# Patient Record
Sex: MALE | URBAN - METROPOLITAN AREA
[De-identification: names, ages, dates, MRNs, and addresses within clinical notes are randomized per-mention and may not be internally consistent; named-entity substitution may affect disease eponyms.]

---

## 2021-08-23 ENCOUNTER — LAB REQUISITION (OUTPATIENT)
Dept: LAB | Age: 61
End: 2021-08-23

## 2021-08-23 DIAGNOSIS — Z00.00 ENCOUNTER FOR GENERAL ADULT MEDICAL EXAMINATION WITHOUT ABNORMAL FINDINGS: ICD-10-CM

## 2021-08-23 PROCEDURE — 86480 TB TEST CELL IMMUN MEASURE: CPT | Performed by: CLINICAL MEDICAL LABORATORY

## 2021-08-23 PROCEDURE — PSEU9049 QUANTIFERON TB PLUS: Performed by: CLINICAL MEDICAL LABORATORY

## 2021-08-25 LAB
GAMMA INTERFERON BACKGROUND BLD IA-ACNC: 0.04 IU/ML
M TB IFN-G BLD-IMP: NEGATIVE
M TB IFN-G CD4+ BCKGRND COR BLD-ACNC: 0.02 IU/ML
M TB IFN-G CD4+CD8+ BCKGRND COR BLD-ACNC: 0.03 IU/ML
MITOGEN IGNF BCKGRD COR BLD-ACNC: 7.65 IU/ML

## 2022-09-19 ENCOUNTER — NURSE ONLY (OUTPATIENT)
Dept: INTERNAL MEDICINE CLINIC | Facility: HOSPITAL | Age: 62
End: 2022-09-19
Attending: EMERGENCY MEDICINE

## 2022-09-19 DIAGNOSIS — Z00.00 WELLNESS EXAMINATION: Primary | ICD-10-CM

## 2022-09-19 LAB
RUBV IGG SER QL: POSITIVE
RUBV IGG SER-ACNC: 124.1 IU/ML (ref 10–?)

## 2022-09-19 PROCEDURE — 86762 RUBELLA ANTIBODY: CPT

## 2022-09-19 PROCEDURE — 86480 TB TEST CELL IMMUN MEASURE: CPT

## 2022-09-19 PROCEDURE — 86735 MUMPS ANTIBODY: CPT

## 2022-09-19 PROCEDURE — 86765 RUBEOLA ANTIBODY: CPT

## 2022-09-19 PROCEDURE — 86787 VARICELLA-ZOSTER ANTIBODY: CPT

## 2022-09-20 LAB
M TB IFN-G CD4+ T-CELLS BLD-ACNC: 0.02 IU/ML
M TB TUBERC IFN-G BLD QL: NEGATIVE
M TB TUBERC IGNF/MITOGEN IGNF CONTROL: >10 IU/ML
QFT TB1 AG MINUS NIL: 0.01 IU/ML
QFT TB2 AG MINUS NIL: 0.01 IU/ML

## 2022-09-21 LAB
MEV IGG SER-ACNC: >300 AU/ML (ref 16.5–?)
MUV IGG SER IA-ACNC: 10.2 AU/ML (ref 11–?)
VZV IGG SER IA-ACNC: 2190 (ref 165–?)

## 2022-10-26 ENCOUNTER — NURSE ONLY (OUTPATIENT)
Dept: INTERNAL MEDICINE CLINIC | Facility: HOSPITAL | Age: 62
End: 2022-10-26
Attending: EMERGENCY MEDICINE

## 2022-10-26 ENCOUNTER — IMMUNIZATION (OUTPATIENT)
Dept: LAB | Facility: HOSPITAL | Age: 62
End: 2022-10-26
Attending: EMERGENCY MEDICINE

## 2022-10-26 DIAGNOSIS — Z23 NEED FOR VACCINATION: Primary | ICD-10-CM

## 2022-10-26 DIAGNOSIS — Z00.00 WELLNESS EXAMINATION: Primary | ICD-10-CM

## 2022-10-26 PROCEDURE — 86735 MUMPS ANTIBODY: CPT

## 2022-10-26 PROCEDURE — 90471 IMMUNIZATION ADMIN: CPT

## 2022-10-28 LAB — MUV IGG SER IA-ACNC: >300 AU/ML (ref 11–?)

## 2023-01-05 ENCOUNTER — OFFICE VISIT (OUTPATIENT)
Facility: CLINIC | Age: 63
End: 2023-01-05
Payer: COMMERCIAL

## 2023-01-05 VITALS
HEIGHT: 77 IN | BODY MASS INDEX: 27.16 KG/M2 | DIASTOLIC BLOOD PRESSURE: 84 MMHG | SYSTOLIC BLOOD PRESSURE: 134 MMHG | OXYGEN SATURATION: 98 % | RESPIRATION RATE: 14 BRPM | HEART RATE: 60 BPM | WEIGHT: 230 LBS

## 2023-01-05 DIAGNOSIS — Z00.00 ADULT GENERAL MEDICAL EXAM: Primary | ICD-10-CM

## 2023-01-05 DIAGNOSIS — Z12.11 SCREEN FOR COLON CANCER: ICD-10-CM

## 2023-01-05 PROCEDURE — 3008F BODY MASS INDEX DOCD: CPT | Performed by: INTERNAL MEDICINE

## 2023-01-05 PROCEDURE — 3075F SYST BP GE 130 - 139MM HG: CPT | Performed by: INTERNAL MEDICINE

## 2023-01-05 PROCEDURE — 99386 PREV VISIT NEW AGE 40-64: CPT | Performed by: INTERNAL MEDICINE

## 2023-01-05 PROCEDURE — 3079F DIAST BP 80-89 MM HG: CPT | Performed by: INTERNAL MEDICINE

## 2023-01-09 ENCOUNTER — LAB ENCOUNTER (OUTPATIENT)
Dept: LAB | Facility: HOSPITAL | Age: 63
End: 2023-01-09
Attending: INTERNAL MEDICINE

## 2023-01-09 DIAGNOSIS — Z00.00 ADULT GENERAL MEDICAL EXAM: ICD-10-CM

## 2023-01-09 LAB
ALBUMIN SERPL-MCNC: 4.1 G/DL (ref 3.4–5)
ALBUMIN/GLOB SERPL: 0.8 {RATIO} (ref 1–2)
ALP LIVER SERPL-CCNC: 58 U/L
ALT SERPL-CCNC: 33 U/L
ANION GAP SERPL CALC-SCNC: 4 MMOL/L (ref 0–18)
AST SERPL-CCNC: 22 U/L (ref 15–37)
BILIRUB SERPL-MCNC: 0.9 MG/DL (ref 0.1–2)
BUN BLD-MCNC: 8 MG/DL (ref 7–18)
BUN/CREAT SERPL: 6.7 (ref 10–20)
CALCIUM BLD-MCNC: 9.3 MG/DL (ref 8.5–10.1)
CHLORIDE SERPL-SCNC: 105 MMOL/L (ref 98–112)
CHOLEST SERPL-MCNC: 181 MG/DL (ref ?–200)
CO2 SERPL-SCNC: 29 MMOL/L (ref 21–32)
COMPLEXED PSA SERPL-MCNC: 1.83 NG/ML (ref ?–4)
CREAT BLD-MCNC: 1.19 MG/DL
DEPRECATED RDW RBC AUTO: 38.9 FL (ref 35.1–46.3)
ERYTHROCYTE [DISTWIDTH] IN BLOOD BY AUTOMATED COUNT: 12.1 % (ref 11–15)
EST. AVERAGE GLUCOSE BLD GHB EST-MCNC: 94 MG/DL (ref 68–126)
FASTING PATIENT LIPID ANSWER: YES
FASTING STATUS PATIENT QL REPORTED: YES
GFR SERPLBLD BASED ON 1.73 SQ M-ARVRAT: 69 ML/MIN/1.73M2 (ref 60–?)
GLOBULIN PLAS-MCNC: 4.9 G/DL (ref 2.8–4.4)
GLUCOSE BLD-MCNC: 94 MG/DL (ref 70–99)
HBA1C MFR BLD: 4.9 % (ref ?–5.7)
HCT VFR BLD AUTO: 47.5 %
HDLC SERPL-MCNC: 39 MG/DL (ref 40–59)
HGB BLD-MCNC: 15.7 G/DL
LDLC SERPL CALC-MCNC: 111 MG/DL (ref ?–100)
MCH RBC QN AUTO: 28.6 PG (ref 26–34)
MCHC RBC AUTO-ENTMCNC: 33.1 G/DL (ref 31–37)
MCV RBC AUTO: 86.5 FL
NONHDLC SERPL-MCNC: 142 MG/DL (ref ?–130)
OSMOLALITY SERPL CALC.SUM OF ELEC: 284 MOSM/KG (ref 275–295)
PLATELET # BLD AUTO: 176 10(3)UL (ref 150–450)
POTASSIUM SERPL-SCNC: 4.1 MMOL/L (ref 3.5–5.1)
PROT SERPL-MCNC: 9 G/DL (ref 6.4–8.2)
RBC # BLD AUTO: 5.49 X10(6)UL
SODIUM SERPL-SCNC: 138 MMOL/L (ref 136–145)
TRIGL SERPL-MCNC: 178 MG/DL (ref 30–149)
TSI SER-ACNC: 2.66 MIU/ML (ref 0.36–3.74)
VLDLC SERPL CALC-MCNC: 31 MG/DL (ref 0–30)
WBC # BLD AUTO: 4.3 X10(3) UL (ref 4–11)

## 2023-01-09 PROCEDURE — 85027 COMPLETE CBC AUTOMATED: CPT

## 2023-01-09 PROCEDURE — 36415 COLL VENOUS BLD VENIPUNCTURE: CPT

## 2023-01-09 PROCEDURE — 80053 COMPREHEN METABOLIC PANEL: CPT

## 2023-01-09 PROCEDURE — 84443 ASSAY THYROID STIM HORMONE: CPT

## 2023-01-09 PROCEDURE — 83036 HEMOGLOBIN GLYCOSYLATED A1C: CPT

## 2023-01-09 PROCEDURE — 80061 LIPID PANEL: CPT

## 2023-02-08 ENCOUNTER — OFFICE VISIT (OUTPATIENT)
Dept: INTEGRATIVE MEDICINE | Facility: CLINIC | Age: 63
End: 2023-02-08
Payer: COMMERCIAL

## 2023-02-08 VITALS
DIASTOLIC BLOOD PRESSURE: 70 MMHG | OXYGEN SATURATION: 98 % | SYSTOLIC BLOOD PRESSURE: 110 MMHG | HEART RATE: 109 BPM | WEIGHT: 230.63 LBS | BODY MASS INDEX: 27 KG/M2 | RESPIRATION RATE: 16 BRPM

## 2023-02-08 DIAGNOSIS — Z78.9 VEGETARIAN DIET: ICD-10-CM

## 2023-02-08 DIAGNOSIS — Z71.3 NUTRITIONAL COUNSELING: ICD-10-CM

## 2023-02-08 DIAGNOSIS — E78.5 DYSLIPIDEMIA: Primary | ICD-10-CM

## 2023-02-08 PROCEDURE — 99204 OFFICE O/P NEW MOD 45 MIN: CPT | Performed by: PHYSICIAN ASSISTANT

## 2023-02-08 PROCEDURE — 3078F DIAST BP <80 MM HG: CPT | Performed by: PHYSICIAN ASSISTANT

## 2023-02-08 PROCEDURE — 3074F SYST BP LT 130 MM HG: CPT | Performed by: PHYSICIAN ASSISTANT

## 2023-02-16 ENCOUNTER — NURSE ONLY (OUTPATIENT)
Facility: CLINIC | Age: 63
End: 2023-02-16

## 2023-02-16 DIAGNOSIS — Z12.11 SCREEN FOR COLON CANCER: Primary | ICD-10-CM

## 2023-02-16 NOTE — PROGRESS NOTES
No prior colonoscopy. No family hx of colon cancer. Anticoagulants: n/a  Diabetic Meds: n/a  BP meds(Ace inhibitors/ARB's): n/a  Weight loss meds (phentermine/vyvanse): n/a  Iron supplement (RX/OTC): n/a  Height & Weight/BMI: 6'5\"/230lb/27.27  Hx of Cardiac/CVA issues/(MI/Stroke): n/a   Devices Pacemaker/Defibrillator/Stents: n/a  Resp. Issues/Oxygen Use/GRAYSON/COPD: n/a  Issues w/Anesthesia: n/a    Symptoms (Y/N): N  Symptoms Details: n/a     Special comments/notes: verified allergies, medications, pharmacy      Please advise on orders and prep, thank you.

## 2023-02-16 NOTE — PROGRESS NOTES
Patient scheduled for first available 4/6 11:30 am. Patient confirmed phone number [phone]. Patient requesting appointment to be scheduled for sooner, also patient already completed questionnaire, asking if he needs to complete again. *Patient indicates she contacted our call center at 8:00 am and was told to wait for call. Patient indicates when he would  the phone, he would not hear anyone on the line.

## 2023-02-16 NOTE — PROGRESS NOTES
Attempted to reach patient x3, rings a few times then stops, unable to leave voice message. If patient calls back after 9am please reschedule and confirm best #.

## 2023-02-16 NOTE — PROGRESS NOTES
Ok to schedule colonoscopy with MAC with split golytely for colorectal cancer screening at [address] [place] [address] or [facility]    Thanks    Joceline Lee MD  [address] [place] [address] - Gastroenterology

## 2023-02-20 NOTE — PROGRESS NOTES
Scheduled for:  Colonoscopy-screen [address]    Provider Name:  Dr. Toni Zimmerman  Date:  Friday, 5/19/2023  Location:  [facility]  Sedation:  MAC  Time:  10:30 AM (pt is aware to arrive at 9:30 AM)  Prep:  Split dose golytely   Meds/Allergies Reconciled?: Physician reviewed  Diagnosis with codes:  Colorectal cancer screening Z12.11  Was patient informed to call insurance with codes (Y/N): I confirmed [employer] with this patient. Referral sent?:  Referral was sent at the time of electronic surgical scheduling. [facility] or [address] notified?:  I sent an electronic request to Endo Scheduling and received a confirmation today. Medication Orders:  n/a  Misc Orders:  Patient was informed that they will need a COVID 19 test prior to their procedure. Patient verbally understood & will await a phone call from [facility] to schedule. Further instructions given by staff:  I discussed the prep instructions with the patient which she verbally understood and is aware that I will send the instructions today via [address]

## 2023-02-23 ENCOUNTER — TELEPHONE (OUTPATIENT)
Facility: CLINIC | Age: 63
End: 2023-02-23

## 2023-02-23 DIAGNOSIS — Z12.11 SCREEN FOR COLON CANCER: Primary | ICD-10-CM

## 2023-02-23 NOTE — TELEPHONE ENCOUNTER
Rescheduled for:  Colonoscopy-screen [address]    Provider Name: From:Dr. David Jones To: Dr Solis Renee  Date:  From: 5/19/2023 To:05/22/2023  Location:  [facility]  Sedation:  MAC  Time:  From:10:30 AM [id number] (pt is aware to arrive at 7:30 AM)  Prep:  Split dose golytely   Meds/Allergies Reconciled?: Physician reviewed  Diagnosis with codes:  Colorectal cancer screening Z12.11  Was patient informed to call insurance with codes (Y/N): I confirmed [employer] with this patient. Referral sent?:  Referral was sent at the time of electronic surgical scheduling. [facility] or [address] notified?:  I sent an electronic change  request to Endo Scheduling and received a confirmation today. Medication Orders:  n/a    Misc Orders:  Patient requesting ONLY Monday for his procedure. Further instructions given by staff: I discussed the prep instructions with the patient which he verbally understood and is aware that I will send the instructions today via [employer].  Patient was informed about Covid testing prior procedure

## 2023-05-02 ENCOUNTER — TELEPHONE (OUTPATIENT)
Facility: CLINIC | Age: 63
End: 2023-05-02

## 2023-05-02 DIAGNOSIS — Z12.11 COLON CANCER SCREENING: Primary | ICD-10-CM

## 2023-05-03 NOTE — TELEPHONE ENCOUNTER
Spoke with the patient and went over the prep questions and also routed message to Dr Rigo Agrawal to send prep to the [place] on file

## 2023-05-03 NOTE — TELEPHONE ENCOUNTER
Dr Reinaldo Daigle     Please send prep to the [facility] on file in [facility]     Procedure is on 5/22/2023

## 2023-05-15 ENCOUNTER — TELEPHONE (OUTPATIENT)
Facility: CLINIC | Age: 63
End: 2023-05-15

## 2023-05-15 NOTE — TELEPHONE ENCOUNTER
Pt is requesting for prep medicine he states pharmacy did not get it yet please follow up pt is requesting for this today if possible thanks

## 2023-05-15 NOTE — TELEPHONE ENCOUNTER
I called [facility] and spoke to [address] [employer] [address] prep is ready for the pt    I called the pt and he is aware the bowel prep is ready for

## 2023-05-16 RX ORDER — SODIUM CHLORIDE 0.9 % (FLUSH) 0.9 %
10 SYRINGE (ML) INJECTION AS NEEDED
OUTPATIENT
Start: 2023-05-16

## 2023-05-16 RX ORDER — SODIUM CHLORIDE, SODIUM LACTATE, POTASSIUM CHLORIDE, CALCIUM CHLORIDE 600; 310; 30; 20 MG/100ML; MG/100ML; MG/100ML; MG/100ML
INJECTION, SOLUTION INTRAVENOUS CONTINUOUS
OUTPATIENT
Start: 2023-05-16

## 2023-05-22 ENCOUNTER — HOSPITAL ENCOUNTER (OUTPATIENT)
Facility: HOSPITAL | Age: 63
Setting detail: HOSPITAL OUTPATIENT SURGERY
Discharge: HOME | End: 2023-05-22
Attending: STUDENT IN AN ORGANIZED HEALTH CARE EDUCATION/TRAINING PROGRAM | Admitting: STUDENT IN AN ORGANIZED HEALTH CARE EDUCATION/TRAINING PROGRAM
Payer: COMMERCIAL

## 2023-05-22 VITALS
RESPIRATION RATE: 15 BRPM | SYSTOLIC BLOOD PRESSURE: 135 MMHG | HEART RATE: 65 BPM | WEIGHT: 225 LBS | BODY MASS INDEX: 26.57 KG/M2 | OXYGEN SATURATION: 100 % | DIASTOLIC BLOOD PRESSURE: 85 MMHG | HEIGHT: 77 IN

## 2023-05-22 DIAGNOSIS — Z12.11 SCREEN FOR COLON CANCER: ICD-10-CM

## 2023-05-22 PROCEDURE — 0DBN8ZX EXCISION OF SIGMOID COLON, VIA NATURAL OR ARTIFICIAL OPENING ENDOSCOPIC, DIAGNOSTIC: ICD-10-PCS | Performed by: STUDENT IN AN ORGANIZED HEALTH CARE EDUCATION/TRAINING PROGRAM

## 2023-05-22 PROCEDURE — 0DBL8ZX EXCISION OF TRANSVERSE COLON, VIA NATURAL OR ARTIFICIAL OPENING ENDOSCOPIC, DIAGNOSTIC: ICD-10-PCS | Performed by: STUDENT IN AN ORGANIZED HEALTH CARE EDUCATION/TRAINING PROGRAM

## 2023-05-22 PROCEDURE — G0500 MOD SEDAT ENDO SERVICE >5YRS: HCPCS | Performed by: STUDENT IN AN ORGANIZED HEALTH CARE EDUCATION/TRAINING PROGRAM

## 2023-05-22 PROCEDURE — 45380 COLONOSCOPY AND BIOPSY: CPT | Performed by: STUDENT IN AN ORGANIZED HEALTH CARE EDUCATION/TRAINING PROGRAM

## 2023-05-22 RX ORDER — MIDAZOLAM HYDROCHLORIDE 1 MG/ML
1 INJECTION INTRAMUSCULAR; INTRAVENOUS EVERY 5 MIN PRN
Status: DISCONTINUED | OUTPATIENT
Start: 2023-05-22 | End: 2023-05-22

## 2023-05-22 RX ORDER — MIDAZOLAM HYDROCHLORIDE 1 MG/ML
INJECTION INTRAMUSCULAR; INTRAVENOUS
Status: DISCONTINUED | OUTPATIENT
Start: 2023-05-22 | End: 2023-05-22

## 2023-05-22 NOTE — H&P
History & Physical Examination    Patient Name: Tristen Snyder  MRN: [id number]  [facility]: [id number]  YOB: 1960    Diagnosis: screening for colon cancer    polyethylene glycol, PEG 3350-KCl-NaBcb-NaCl-NaSulf, 236 g Oral Recon Soln, Take 4,000 mL by mouth As Directed. Take 2,000 mL the night before your procedure and 2,000 mL the morning of your procedure., Disp: 1 each, Rfl: 0      No current facility-administered medications for this encounter. Allergies: No Known Allergies    History reviewed. No pertinent past medical history. History reviewed. No pertinent surgical history. History reviewed. No pertinent family history. Social History    Tobacco Use      Smoking status: Never      Smokeless tobacco: Never    Vaping Use      Vaping Use: Never used    Alcohol use: Never      SYSTEM Check if Review is Normal Check if Physical Exam is Normal If not normal, please explain:   VERENA [employer] ] [ Opal Cross  [employer] ] [ Joel Sawyer [employer] ] [ Airam Sahni [employer] ] [ Tracy Issa [employer] ] [ Tuan Zhao [employer] ] [ Norma Finely        I have discussed the risks and benefits and alternatives of the procedure with the patient/family. They understand and agree to proceed with plan of care. I have reviewed the History and Physical done within the last 30 days. Any changes noted above.     Vin Simental MD  [facility], [facility] Gastroenterology

## 2023-05-22 NOTE — DISCHARGE INSTRUCTIONS

## 2023-05-23 ENCOUNTER — TELEPHONE (OUTPATIENT)
Facility: CLINIC | Age: 63
End: 2023-05-23

## 2023-05-23 NOTE — PROGRESS NOTES
1 tubular adenoma, 3 hyperplastic polyps, needs repeat in 5 years. GI Staff:    Can you please place recall for this patient to have a colonoscopy in 5 years. Thank you.     Deedee Borrego MD

## 2023-05-23 NOTE — TELEPHONE ENCOUNTER
I called the patient to discuss biopsy results from recent colonoscopy. I did send him a detailed [employer] message with the results. He did not answer my phone call but I left a message stating I was completing my chart message. If he has further questions he should call the clinic and I can further discuss with him.

## 2023-05-24 ENCOUNTER — TELEPHONE (OUTPATIENT)
Facility: CLINIC | Age: 63
End: 2023-05-24

## 2023-05-24 NOTE — TELEPHONE ENCOUNTER
Health maintenance updated.      Last colonoscopy done 5/22/2023    Recall placed into Pt Outreach, next due on 5/2028 per Dr. Maria Del Carmen Carpenter

## 2023-05-26 ENCOUNTER — MED REC SCAN ONLY (OUTPATIENT)
Facility: CLINIC | Age: 63
End: 2023-05-26

## 2023-11-20 NOTE — TELEPHONE ENCOUNTER
Routed to RN triage to run for protocol , thanks. Requested Prescriptions     Pending Prescriptions Disp Refills    indomethacin 50 MG Oral Cap 90 capsule 3     Sig: Once a day    allopurinol 100 MG Oral Tab 90 tablet 3     Sig: Take 1 tablet (100 mg total) by mouth daily.        Last Office Visit with PCP: 1/5/2023  Last Blood Pressures:  BP Readings from Last 2 Encounters:   05/22/23 135/85   02/08/23 110/70

## 2023-11-21 RX ORDER — INDOMETHACIN 50 MG/1
CAPSULE ORAL
Qty: 90 CAPSULE | Refills: 0 | Status: SHIPPED | OUTPATIENT
Start: 2023-11-21

## 2023-11-21 RX ORDER — ALLOPURINOL 100 MG/1
100 TABLET ORAL DAILY
Qty: 90 TABLET | Refills: 0 | Status: SHIPPED | OUTPATIENT
Start: 2023-11-21

## 2023-11-22 NOTE — TELEPHONE ENCOUNTER
BOTH prescriptions were not listed under ACTIVE or INACTIVE medication record. BUT on unreconciled outside medication . Pended and sent to PCP for approval.     [employer]pper message sent . CSS=please assists for 6 month follow up. LAST VISIT 1/5/2023. No future appointments. Please review; protocol failed/no protocol. Requested Prescriptions   Pending Prescriptions Disp Refills    indomethacin 50 MG Oral Cap 90 capsule 3     Sig: Once a day       Non-Narcotic Pain Medication Protocol Failed - 11/20/2023  1:53 PM        Failed - In person appointment or virtual visit in the past 6 mos or appointment in next 3 mos     Recent Outpatient Visits              9 months ago Screen for colon cancer    [address], [address], [facility] 14 Only    9 months ago Dyslipidemia    [address], [address], Jún[address]endleton., PA-C    Office Visit    10 months ago Adult general medical exam    oRnan Alvarado MD    Office Visit    1 year ago Wellness examination    [address]    Nurse Only    1 year ago Wellness examination    [address]    Nurse Only                        allopurinol 100 MG Oral Tab 90 tablet 3     Sig: Take 1 tablet (100 mg total) by mouth daily.        There is no refill protocol information for this order           Recent Outpatient Visits              9 months ago Screen for colon cancer    Elizabet Ramirez    Nurse Only    9 months ago Dyslipidemia    [address], [address] PA-C    Office Visit    10 months ago Adult general medical exam    [address], Ronan Landaverde MD    Office Visit    1 year ago Wellness examination    [address]    Nurse Only    1 year ago Wellness examination [address]    Nurse Only

## 2023-12-04 ENCOUNTER — LAB ENCOUNTER (OUTPATIENT)
Dept: LAB | Facility: HOSPITAL | Age: 63
End: 2023-12-04
Attending: PREVENTIVE MEDICINE
Payer: COMMERCIAL

## 2023-12-04 ENCOUNTER — TELEPHONE (OUTPATIENT)
Dept: INTERNAL MEDICINE CLINIC | Facility: HOSPITAL | Age: 63
End: 2023-12-04

## 2023-12-04 DIAGNOSIS — Z20.822 SUSPECTED COVID-19 VIRUS INFECTION: ICD-10-CM

## 2023-12-04 DIAGNOSIS — Z20.822 SUSPECTED COVID-19 VIRUS INFECTION: Primary | ICD-10-CM

## 2023-12-04 LAB — SARS-COV-2 RNA RESP QL NAA+PROBE: NOT DETECTED

## 2023-12-04 NOTE — TELEPHONE ENCOUNTER
Results and RTW guidelines:    COVID RESULT:    [x] Viewed by employee in [address] RTW plan and instructions as indicated on triage call. Manager notified. Estimated RTW date: 12/04/2023  [x] Discussed with employee   [] Unable to reach by phone.   Sent via [employer] [facility] message      Test type:    [x] Rapid         [] Alinity         [] Outside test:       [x] NEGATIVE     Ordered Alinity retest?  []Yes   [x] No (skip to RTW)   Ordered Rapid retest?   []Yes   [x] No (skip to RTW)           Dated to be taken:      If Yes, PLACE ORDER NOW and instruct the following:  -Originally Symptomatic or Now Symptoms:   -RTW when sx improve- fever free for 24 hours w/o medications, Diarrhea/Vomiting for 24 hours w/o medications    -Originally  Asymptomatic  -Asymptomatic AND Vaccinated or Unvaccinated or Prior infection in past 90 days:   -May work and continue to monitor symptoms for the next 10 days.                                         -Alinity test day 2, Alinity test day 5 (day 0 - exposure)

## 2023-12-04 NOTE — TELEPHONE ENCOUNTER
Outside Covid Testing done    Results and RTW guidelines:    COVID RESULT reported:      Test type:    [] Rapid outside         [] PCR outside       [x] Home Test    Date of test: 12/3/2023     Test location: Home         [] Result viewed in Epic with verbal consent received from employee     [x] Results per Employee Covid Dashboard    [] Employee instructed to email copy of result to [email or web address]       [x] Discussed with employee     [] Unable to reach by phone. Sent via [employer]      [x] NEGATIVE     Ordered Alinity retest?  []Yes   [x] No (skip to RTW)   Ordered Rapid retest?   [x]Yes   [] No (skip to RTW)        Dated to be taken:  12/04/2023  If Yes, PLACE ORDER NOW and instruct the following:  -Originally Symptomatic or Now Symptoms:   -RTW when sx improve- fever free for 24 hours w/o medications, Diarrhea/Vomiting for 24 hours w/o medications     -Originally  Asymptomatic  -Asymptomatic AND Vaccinated or Unvaccinated or Prior infection in past 90 days:     -May work and continue to monitor symptoms for the next 14 days.                                           -Rapid test day 2, rapid test day 5 (day 0 - exposure)              Notes:     RTW PLAN:    []  If COVID positive results, off work minimum of 5 days from positive test or onset of symptoms (day 0)        On day 5, if asymptomatic or mildly symptomatic (with improving symptoms) may return to work day 6          On day 5, if symptomatic, call Employee Health for RTW screening        []  COVID positive result - call Employee Health on day 5 after symptom onset. The employee needs to be cleared by Employee Health to RTW. [] RTW immediately, continue to monitor for sx  [] RTW when sx improve; must be fever free for 24 hours w/o medications, Diarrhea/Vomiting free for 24 hours w/o medications  [] Alinity ordered; continue to monitor sx and call for new/worsening sx.   Discuss RTW guidelines with manager [x] Rapid ordered to confirm home negative. [] May continue to work  [] Follow up with PCP  [] Home until further instruction from hotline with Alinity results  INSTRUCTIONS PROVIDED:  [x]  Plan as noted above  [x]  Length of time to obtain results  [x]  May return to work if views negative in My Chart and  remains fever, vomiting, and diarrhea free  []  May continue to work if remains asymptomatic   []  Quarantine instructions  [x]  Masking protocol  [x]  S/S of worsening infection/condition and importance of prompt medical re-evaluation including when to seek emergency care  [] If symptoms develop, stay home and call hotline for rapid test order    Estimated RTW date:  Pending Test results  [x] Manager notified        [] [address] [facility]  []CHRISTIE   [x] [facility]  Manager : Jeronimo Curtis COVID-19 Vaccine?  Yes [x]    No []          If yes, date(s) received: 04/03/2021; 04/24/2021; 12/16/2022           Which vaccine:  Pfizer [x]     Billy Arzate []    J&J []      SYMPTOMS (reported via dashboard):  [] asymptomatic  [x] symptomatic  [] GI symptoms only    Symptom onset date: 12/3/2023    Fever   > 100F             Yes []      Cough                          Yes []      Shortness of breath  Yes []      Congestion                 Yes []      Runny nose                Yes []        Loss of Smell              Yes []        Loss of Taste             Yes []       Sore throat                 Yes [x]       Fatigue                        Yes []       Body Aches                Yes []        Chills                           Yes []        Headache                   Yes []             GI symptoms             Yes []     No [x]                     Nausea   []          Vomiting            []                                    Diarrhea  []          Upset stomach []      Employee has positive COVID Exposure? Yes []     No [x]    Date of exposure:     []  Coworker                       [] patient                        [] Family/friend    Employee has a history of Covid?   Yes []     No [x]   If Yes, when:    When was the last shift you worked?: 12/01/2023    Notes:

## 2024-02-08 ENCOUNTER — TELEPHONE (OUTPATIENT)
Facility: CLINIC | Age: 64
End: 2024-02-08

## 2024-02-08 NOTE — TELEPHONE ENCOUNTER
Pt came in to drop off a medical exemption request form that needs to be filled out by pcp  Form has been placed in drs bin  Pt needs it asap

## 2024-02-12 NOTE — TELEPHONE ENCOUNTER
Patient sent a [employer] message; please advise patient when form is complete and faxed.    I ssubmitted a form to be signed by Dr. Beyer. I dropped it off at the office on Wednesday 2/7. I asked for it to be faxed back to the number written on the form. When will I received it?

## 2024-02-16 NOTE — TELEPHONE ENCOUNTER
Verified name and date of birth.   The patient is requesting a physical with the form to be filled out.   Instructed their maybe an additional fee.  I cannot guarantee their will not be a fee.    He stated he needs a physical with the form filled out.   Transferred to the call center for the patient schedule via [employer] and did not schedule a physical.    Last office visit 1/5/23.

## 2024-02-20 ENCOUNTER — OFFICE VISIT (OUTPATIENT)
Dept: INTERNAL MEDICINE CLINIC | Facility: CLINIC | Age: 64
End: 2024-02-20
Payer: COMMERCIAL

## 2024-02-20 ENCOUNTER — LAB ENCOUNTER (OUTPATIENT)
Dept: LAB | Age: 64
End: 2024-02-20
Attending: NURSE PRACTITIONER
Payer: COMMERCIAL

## 2024-02-20 VITALS
WEIGHT: 223 LBS | HEART RATE: 52 BPM | SYSTOLIC BLOOD PRESSURE: 160 MMHG | HEIGHT: 77 IN | BODY MASS INDEX: 26.33 KG/M2 | DIASTOLIC BLOOD PRESSURE: 83 MMHG

## 2024-02-20 DIAGNOSIS — E55.9 VITAMIN D DEFICIENCY: ICD-10-CM

## 2024-02-20 DIAGNOSIS — Z00.00 ANNUAL PHYSICAL EXAM: ICD-10-CM

## 2024-02-20 DIAGNOSIS — E53.8 VITAMIN B 12 DEFICIENCY: ICD-10-CM

## 2024-02-20 DIAGNOSIS — Z00.00 ANNUAL PHYSICAL EXAM: Primary | ICD-10-CM

## 2024-02-20 DIAGNOSIS — Z00.00 PHYSICAL EXAM: ICD-10-CM

## 2024-02-20 DIAGNOSIS — M54.32 SCIATICA OF LEFT SIDE: ICD-10-CM

## 2024-02-20 DIAGNOSIS — I10 PRIMARY HYPERTENSION: ICD-10-CM

## 2024-02-20 LAB
ALBUMIN SERPL-MCNC: 4.3 G/DL (ref 3.2–4.8)
ALBUMIN/GLOB SERPL: 1.1 {RATIO} (ref 1–2)
ALP LIVER SERPL-CCNC: 51 U/L
ALT SERPL-CCNC: 21 U/L
ANION GAP SERPL CALC-SCNC: 7 MMOL/L (ref 0–18)
AST SERPL-CCNC: 24 U/L (ref ?–34)
BILIRUB SERPL-MCNC: 1.1 MG/DL (ref 0.2–1.1)
BUN BLD-MCNC: 10 MG/DL (ref 9–23)
BUN/CREAT SERPL: 9.3 (ref 10–20)
CALCIUM BLD-MCNC: 9.9 MG/DL (ref 8.7–10.4)
CHLORIDE SERPL-SCNC: 105 MMOL/L (ref 98–112)
CHOLEST SERPL-MCNC: 191 MG/DL (ref ?–200)
CO2 SERPL-SCNC: 28 MMOL/L (ref 21–32)
COMPLEXED PSA SERPL-MCNC: 1.62 NG/ML (ref ?–4)
CREAT BLD-MCNC: 1.08 MG/DL
DEPRECATED RDW RBC AUTO: 39.3 FL (ref 35.1–46.3)
EGFRCR SERPLBLD CKD-EPI 2021: 77 ML/MIN/1.73M2 (ref 60–?)
ERYTHROCYTE [DISTWIDTH] IN BLOOD BY AUTOMATED COUNT: 12.6 % (ref 11–15)
FASTING PATIENT LIPID ANSWER: YES
FASTING STATUS PATIENT QL REPORTED: YES
GLOBULIN PLAS-MCNC: 4 G/DL (ref 2.8–4.4)
GLUCOSE BLD-MCNC: 93 MG/DL (ref 70–99)
HCT VFR BLD AUTO: 44.2 %
HDLC SERPL-MCNC: 37 MG/DL (ref 40–59)
HGB BLD-MCNC: 14.7 G/DL
LDLC SERPL CALC-MCNC: 130 MG/DL (ref ?–100)
MCH RBC QN AUTO: 28.4 PG (ref 26–34)
MCHC RBC AUTO-ENTMCNC: 33.3 G/DL (ref 31–37)
MCV RBC AUTO: 85.3 FL
NONHDLC SERPL-MCNC: 154 MG/DL (ref ?–130)
OSMOLALITY SERPL CALC.SUM OF ELEC: 289 MOSM/KG (ref 275–295)
PLATELET # BLD AUTO: 167 10(3)UL (ref 150–450)
POTASSIUM SERPL-SCNC: 4.3 MMOL/L (ref 3.5–5.1)
PROT SERPL-MCNC: 8.3 G/DL (ref 5.7–8.2)
RBC # BLD AUTO: 5.18 X10(6)UL
SODIUM SERPL-SCNC: 140 MMOL/L (ref 136–145)
TRIGL SERPL-MCNC: 131 MG/DL (ref 30–149)
TSI SER-ACNC: 2.4 MIU/ML (ref 0.55–4.78)
VIT B12 SERPL-MCNC: 1894 PG/ML (ref 211–911)
VIT D+METAB SERPL-MCNC: 18.1 NG/ML (ref 30–100)
VLDLC SERPL CALC-MCNC: 24 MG/DL (ref 0–30)
WBC # BLD AUTO: 4 X10(3) UL (ref 4–11)

## 2024-02-20 PROCEDURE — 80053 COMPREHEN METABOLIC PANEL: CPT

## 2024-02-20 PROCEDURE — 99396 PREV VISIT EST AGE 40-64: CPT | Performed by: NURSE PRACTITIONER

## 2024-02-20 PROCEDURE — 90715 TDAP VACCINE 7 YRS/> IM: CPT | Performed by: NURSE PRACTITIONER

## 2024-02-20 PROCEDURE — 36415 COLL VENOUS BLD VENIPUNCTURE: CPT

## 2024-02-20 PROCEDURE — 85027 COMPLETE CBC AUTOMATED: CPT

## 2024-02-20 PROCEDURE — 84443 ASSAY THYROID STIM HORMONE: CPT

## 2024-02-20 PROCEDURE — 90471 IMMUNIZATION ADMIN: CPT | Performed by: NURSE PRACTITIONER

## 2024-02-20 PROCEDURE — 82306 VITAMIN D 25 HYDROXY: CPT

## 2024-02-20 PROCEDURE — 82607 VITAMIN B-12: CPT

## 2024-02-20 PROCEDURE — 80061 LIPID PANEL: CPT

## 2024-02-20 RX ORDER — CHOLECALCIFEROL (VITAMIN D3) 1250 MCG
1 CAPSULE ORAL WEEKLY
Qty: 12 CAPSULE | Refills: 0 | Status: SHIPPED | OUTPATIENT
Start: 2024-02-20 | End: 2024-05-08

## 2024-02-20 NOTE — ASSESSMENT & PLAN NOTE
Normal exam.  Labs as ordered.  Skin check normal.  Hernial orifices intact.  No cervical, inguinal, axillary lymphadenopathy.    Colonoscopy- in 2023.  Immunizations TDAP today             I

## 2024-02-20 NOTE — ASSESSMENT & PLAN NOTE
Hypertension    Blood pressure 160/83, pulse 52, height 6' 5\" (1.956 m), weight 223 lb (101.2 kg).   Patient does not want any medication    Plan  Education material sent to his mychart  Try magnesium supplement  Return in two weeks

## 2024-02-20 NOTE — PROGRESS NOTES
HPI:    Patient ID: Aguila Pradhan is a 64 year old male.  Lives with wife.   He has 3 children  HPI Physical Exam  64 year old male who is a patient of Dr. Beyer.  I am meeting him for the first time.  He works as a  at [employer] and [employer]     Back Pain  Has some sciatica pain down his left leg.    HTN  Blood pressure 160/83, pulse 52, height 6' 5\" (1.956 m), weight 223 lb (101.2 kg).     Colonoscopy 2023- Returned in 5 years due 2028  Immunization History   Administered Date(s) Administered    Covid-19 Vaccine Pfizer 30 mcg/0.3 ml 04/03/2021, 04/24/2021, 12/16/2021    Flucelvax 0.5 Ml Quad PFS Single Dose 10/26/2022    TDAP 02/20/2024       Past Medical History:   Diagnosis Date    Primary hypertension 2/20/2024      Past Surgical History:   Procedure Laterality Date    Colonoscopy N/A 5/22/2023    Procedure: COLONOSCOPY;  Surgeon: Galileo Madera MD;  Location: [facility] ENDOSCOPY      Social History     Socioeconomic History    Marital status:    Tobacco Use    Smoking status: Never    Smokeless tobacco: Never   Vaping Use    Vaping Use: Never used   Substance and Sexual Activity    Alcohol use: Never    Drug use: Never    Sexual activity: Yes          Review of Systems   Constitutional:  Negative for chills, fatigue and fever.   HENT:  Negative for congestion, ear pain, hearing loss, sinus pain, sore throat and trouble swallowing.    Eyes:  Negative for pain and visual disturbance.   Respiratory:  Negative for cough, chest tightness and shortness of breath.    Cardiovascular:  Negative for chest pain, palpitations and leg swelling.   Gastrointestinal:  Negative for abdominal pain, constipation, diarrhea, nausea and vomiting.   Endocrine: Negative for cold intolerance and heat intolerance.   Genitourinary:  Negative for dysuria and hematuria.   Musculoskeletal:  Positive for back pain. Negative for joint swelling.   Skin:  Negative for rash.   Allergic/Immunologic: Negative for environmental  allergies.   Neurological:  Negative for weakness, numbness and headaches.   Hematological:  Does not bruise/bleed easily.   Psychiatric/Behavioral:  Negative for dysphoric mood and sleep disturbance. The patient is not nervous/anxious.               Current Outpatient Medications   Medication Sig Dispense Refill    allopurinol 100 MG Oral Tab Take 1 tablet (100 mg total) by mouth daily. 90 tablet 0    indomethacin 50 MG Oral Cap Once a day (Patient not taking: Reported on 2/20/2024) 90 capsule 0     Allergies:No Known Allergies   PHYSICAL EXAM:   Physical Exam  Constitutional:       Appearance: Normal appearance. He is well-developed.   HENT:      Head: Normocephalic.      Right Ear: Tympanic membrane normal.      Left Ear: Tympanic membrane normal.      Nose: Nose normal.      Mouth/Throat:      Mouth: Mucous membranes are moist.      Pharynx: No oropharyngeal exudate or posterior oropharyngeal erythema.   Eyes:      General:         Right eye: No discharge.         Left eye: No discharge.      Pupils: Pupils are equal, round, and reactive to light.   Cardiovascular:      Rate and Rhythm: Normal rate and regular rhythm.      Heart sounds: Normal heart sounds. No murmur heard.     No friction rub. No gallop.   Pulmonary:      Effort: Pulmonary effort is normal. No respiratory distress.      Breath sounds: Normal breath sounds. No wheezing, rhonchi or rales.   Abdominal:      General: Bowel sounds are normal. There is no distension.      Palpations: Abdomen is soft. There is no mass.      Tenderness: There is no abdominal tenderness. There is no right CVA tenderness, left CVA tenderness or guarding.   Musculoskeletal:         General: No tenderness.      Cervical back: Normal range of motion and neck supple. No tenderness.      Right lower leg: No edema.      Left lower leg: No edema.   Lymphadenopathy:      Cervical: No cervical adenopathy.   Skin:     General: Skin is warm and dry.      Findings: No rash.    Neurological:      Mental Status: He is alert and oriented to person, place, and time.      Coordination: Coordination normal.      Gait: Gait normal.   Psychiatric:         Mood and Affect: Mood normal.         Behavior: Behavior normal.         Thought Content: Thought content normal.         Judgment: Judgment normal.       /83 (BP Location: Left arm, Patient Position: Sitting, Cuff Size: adult)   Pulse 52   Ht 6' 5\" (1.956 m)   Wt 223 lb (101.2 kg)   BMI 26.44 kg/m²   Wt Readings from Last 2 Encounters:   02/20/24 223 lb (101.2 kg)   05/22/23 225 lb (102.1 kg)     Body mass index is 26.44 kg/m².(2)  Lab Results   Component Value Date    WBC 4.3 01/09/2023    RBC 5.49 01/09/2023    HGB 15.7 01/09/2023    HCT 47.5 01/09/2023    MCV 86.5 01/09/2023    MCH 28.6 01/09/2023    MCHC 33.1 01/09/2023    RDW 12.1 01/09/2023    .0 01/09/2023      Lab Results   Component Value Date    GLU 94 01/09/2023    BUN 8 01/09/2023    BUNCREA 6.7 (L) 01/09/2023    CREATSERUM 1.19 01/09/2023    ANIONGAP 4 01/09/2023    CA 9.3 01/09/2023    OSMOCALC 284 01/09/2023    ALKPHO 58 01/09/2023    AST 22 01/09/2023    ALT 33 01/09/2023    BILT 0.9 01/09/2023    TP 9.0 (H) 01/09/2023    ALB 4.1 01/09/2023    GLOBULIN 4.9 (H) 01/09/2023     01/09/2023    K 4.1 01/09/2023     01/09/2023    CO2 29.0 01/09/2023      Lab Results   Component Value Date    EAG 94 01/09/2023    A1C 4.9 01/09/2023      Lab Results   Component Value Date    CHOLEST 181 01/09/2023    TRIG 178 (H) 01/09/2023    HDL 39 (L) 01/09/2023     (H) 01/09/2023    VLDL 31 (H) 01/09/2023    NONHDLC 142 (H) 01/09/2023      Lab Results   Component Value Date    TSH 2.660 01/09/2023                ASSESSMENT/PLAN:     Problem List Items Addressed This Visit       Physical exam     Normal exam.  Labs as ordered.  Skin check normal.  Hernial orifices intact.  No cervical, inguinal, axillary lymphadenopathy.    Colonoscopy- in 2023.  Immunizations  TDAP today             I          Primary hypertension     Hypertension    Blood pressure 160/83, pulse 52, height 6' 5\" (1.956 m), weight 223 lb (101.2 kg).   Patient does not want any medication    Plan  Education material sent to his [employer]  Try magnesium supplement  Return in two weeks           Sciatica of left side     Intermittent sciatic pain    Plan  Educational material sent to patient via [email or web address]          Other Visit Diagnoses       Annual physical exam    -  Primary    Relevant Orders    Comp Metabolic Panel (14)    Lipid Panel    TSH W Reflex To Free T4    Vitamin D, 25-Hydroxy    Vitamin B12    PSA Total, Screen    CBC, Platelet; No Differential    Vitamin D deficiency        Relevant Orders    PSA Total, Screen    Vitamin B 12 deficiency        Relevant Orders    Vitamin B12               Orders Placed This Encounter   Procedures    Comp Metabolic Panel (14)    Lipid Panel    TSH W Reflex To Free T4    Vitamin D, 25-Hydroxy    Vitamin B12    PSA Total, Screen    CBC, Platelet; No Differential    TETANUS, DIPHTHERIA TOXOIDS AND ACELLULAR PERTUSIS VACCINE (TDAP), >7 YEARS, IM USE     Follow up in two weeks    Meds This Visit:  Requested Prescriptions      No prescriptions requested or ordered in this encounter       Imaging & Referrals:  TETANUS, DIPHTHERIA TOXOIDS AND ACELLULAR PERTUSIS VACCINE (TDAP), >7 YEARS, IM USE         RD Mcclellan

## 2024-02-21 ENCOUNTER — MED REC SCAN ONLY (OUTPATIENT)
Dept: INTERNAL MEDICINE CLINIC | Facility: CLINIC | Age: 64
End: 2024-02-21

## 2025-02-21 ENCOUNTER — OFFICE VISIT (OUTPATIENT)
Dept: INTERNAL MEDICINE CLINIC | Facility: CLINIC | Age: 65
End: 2025-02-21

## 2025-02-21 ENCOUNTER — LAB ENCOUNTER (OUTPATIENT)
Dept: LAB | Age: 65
End: 2025-02-21
Attending: INTERNAL MEDICINE
Payer: COMMERCIAL

## 2025-02-21 VITALS
HEART RATE: 59 BPM | OXYGEN SATURATION: 96 % | BODY MASS INDEX: 25.98 KG/M2 | WEIGHT: 220 LBS | DIASTOLIC BLOOD PRESSURE: 76 MMHG | SYSTOLIC BLOOD PRESSURE: 126 MMHG | HEIGHT: 77 IN

## 2025-02-21 DIAGNOSIS — M54.50 LOW BACK PAIN WITHOUT SCIATICA, UNSPECIFIED BACK PAIN LATERALITY, UNSPECIFIED CHRONICITY: ICD-10-CM

## 2025-02-21 DIAGNOSIS — Z23 NEED FOR PNEUMOCOCCAL VACCINATION: ICD-10-CM

## 2025-02-21 DIAGNOSIS — Z00.00 ADULT GENERAL MEDICAL EXAM: Primary | ICD-10-CM

## 2025-02-21 DIAGNOSIS — Z00.00 ADULT GENERAL MEDICAL EXAM: ICD-10-CM

## 2025-02-21 DIAGNOSIS — E55.9 VITAMIN D DEFICIENCY: ICD-10-CM

## 2025-02-21 PROBLEM — M10.9 GOUT: Status: ACTIVE | Noted: 2025-02-21

## 2025-02-21 LAB
ALBUMIN SERPL-MCNC: 4.6 G/DL (ref 3.2–4.8)
ALBUMIN/GLOB SERPL: 1.3 {RATIO} (ref 1–2)
ALP LIVER SERPL-CCNC: 51 U/L
ALT SERPL-CCNC: 21 U/L
ANION GAP SERPL CALC-SCNC: 7 MMOL/L (ref 0–18)
AST SERPL-CCNC: 25 U/L (ref ?–34)
BILIRUB SERPL-MCNC: 0.9 MG/DL (ref 0.2–1.1)
BUN BLD-MCNC: 11 MG/DL (ref 9–23)
BUN/CREAT SERPL: 9.9 (ref 10–20)
CALCIUM BLD-MCNC: 9.5 MG/DL (ref 8.7–10.4)
CHLORIDE SERPL-SCNC: 102 MMOL/L (ref 98–112)
CHOLEST SERPL-MCNC: 178 MG/DL (ref ?–200)
CO2 SERPL-SCNC: 30 MMOL/L (ref 21–32)
COMPLEXED PSA SERPL-MCNC: 1.6 NG/ML (ref ?–4)
CREAT BLD-MCNC: 1.11 MG/DL
DEPRECATED RDW RBC AUTO: 39.2 FL (ref 35.1–46.3)
EGFRCR SERPLBLD CKD-EPI 2021: 74 ML/MIN/1.73M2 (ref 60–?)
ERYTHROCYTE [DISTWIDTH] IN BLOOD BY AUTOMATED COUNT: 12.7 % (ref 11–15)
EST. AVERAGE GLUCOSE BLD GHB EST-MCNC: 91 MG/DL (ref 68–126)
FASTING PATIENT LIPID ANSWER: NO
FASTING STATUS PATIENT QL REPORTED: NO
GLOBULIN PLAS-MCNC: 3.6 G/DL (ref 2–3.5)
GLUCOSE BLD-MCNC: 83 MG/DL (ref 70–99)
HBA1C MFR BLD: 4.8 % (ref ?–5.7)
HCT VFR BLD AUTO: 42.9 %
HDLC SERPL-MCNC: 37 MG/DL (ref 40–59)
HGB BLD-MCNC: 14.4 G/DL
LDLC SERPL CALC-MCNC: 122 MG/DL (ref ?–100)
MCH RBC QN AUTO: 28.5 PG (ref 26–34)
MCHC RBC AUTO-ENTMCNC: 33.6 G/DL (ref 31–37)
MCV RBC AUTO: 85 FL
NONHDLC SERPL-MCNC: 141 MG/DL (ref ?–130)
OSMOLALITY SERPL CALC.SUM OF ELEC: 287 MOSM/KG (ref 275–295)
PLATELET # BLD AUTO: 159 10(3)UL (ref 150–450)
POTASSIUM SERPL-SCNC: 4.1 MMOL/L (ref 3.5–5.1)
PROT SERPL-MCNC: 8.2 G/DL (ref 5.7–8.2)
RBC # BLD AUTO: 5.05 X10(6)UL
SODIUM SERPL-SCNC: 139 MMOL/L (ref 136–145)
TRIGL SERPL-MCNC: 101 MG/DL (ref 30–149)
TSI SER-ACNC: 1.82 UIU/ML (ref 0.55–4.78)
VIT B12 SERPL-MCNC: >2000 PG/ML (ref 211–911)
VLDLC SERPL CALC-MCNC: 18 MG/DL (ref 0–30)
WBC # BLD AUTO: 4.3 X10(3) UL (ref 4–11)

## 2025-02-21 PROCEDURE — 84443 ASSAY THYROID STIM HORMONE: CPT

## 2025-02-21 PROCEDURE — 36415 COLL VENOUS BLD VENIPUNCTURE: CPT

## 2025-02-21 PROCEDURE — 82607 VITAMIN B-12: CPT

## 2025-02-21 PROCEDURE — 80053 COMPREHEN METABOLIC PANEL: CPT

## 2025-02-21 PROCEDURE — 83036 HEMOGLOBIN GLYCOSYLATED A1C: CPT

## 2025-02-21 PROCEDURE — 80061 LIPID PANEL: CPT

## 2025-02-21 PROCEDURE — 85027 COMPLETE CBC AUTOMATED: CPT

## 2025-02-21 NOTE — PROGRESS NOTES
Aguila Pradhan is a 65 year old male.  Chief Complaint   Patient presents with    Physical     Would like to get full blood work    Back Pain     HPI:   65-year-old gentleman with medical history significant for gouty arthritis here for physical.  He reports that he is doing well except having low back pain.  He was a .  He reports that his back pain is more on the lower back on the left side.  There is no definite radiation however discomfort can be relieved by change in the position while he is lying down.  Denies any urinary incontinence or retention.  Denies any weakness in the legs.  He also has gouty arthritis and he was taking 1 medication.  I informed him not to take allopurinol while doing flareups.  He can continue with indomethacin with meals during flareups.  Apart from this, he has no other complaints.    My last encounter with him was in January 2023.    Past medical history none     Past surgical history none     He is not allergic to any medication.     He does not smoke, denies alcohol or recreational drug abuse.     Family history-he denies any prostate cancer or colon cancer in the family.  He denies any premature heart disease or venous thromboembolic disease.     He works as a .      Current Outpatient Medications   Medication Sig Dispense Refill    allopurinol 100 MG Oral Tab Take 1 tablet (100 mg total) by mouth daily. 90 tablet 0    indomethacin 50 MG Oral Cap Once a day (Patient not taking: Reported on 2/21/2025) 90 capsule 0      Past Medical History:    Primary hypertension      Past Surgical History:   Procedure Laterality Date    Colonoscopy N/A 5/22/2023    Procedure: COLONOSCOPY;  Surgeon: Galileo Madera MD;  Location: [facility] ENDOSCOPY      Social History:  Social History     Socioeconomic History    Marital status:    Tobacco Use    Smoking status: Never    Smokeless tobacco: Never   Vaping Use    Vaping status: Never Used   Substance and Sexual  Activity    Alcohol use: Never    Drug use: Never    Sexual activity: Yes     Social Drivers of Health     Food Insecurity: No Food Insecurity (2/21/2025)    NCSS - Food Insecurity     Worried About Running Out of Food in the Last Year: No     Ran Out of Food in the Last Year: No   Transportation Needs: No Transportation Needs (2/21/2025)    NCSS - Transportation     Lack of Transportation: No   Housing Stability: Not At Risk (2/21/2025)    NCSS - Housing/Utilities     Has Housing: Yes     Worried About Losing Housing: No     Unable to Get Utilities: No      History reviewed. No pertinent family history.   Allergies[1]     REVIEW OF SYSTEMS:   Review of Systems   Review of Systems   Constitutional: Negative for activity change, appetite change and fever.   HENT: Negative for congestion and voice change.    Respiratory: Negative for cough and shortness of breath.    Cardiovascular: Negative for chest pain.   Gastrointestinal: Negative for abdominal distention, abdominal pain and vomiting.   Genitourinary: Negative for hematuria.   Skin: Negative for wound.   Psychiatric/Behavioral: Negative for behavioral problems.   Wt Readings from Last 5 Encounters:   02/21/25 220 lb (99.8 kg)   02/20/24 223 lb (101.2 kg)   05/22/23 225 lb (102.1 kg)   02/08/23 230 lb 9.6 oz (104.6 kg)   01/05/23 230 lb (104.3 kg)     Body mass index is 26.09 kg/m².      EXAM:   /76   Pulse 59   Ht 6' 5\" (1.956 m)   Wt 220 lb (99.8 kg)   SpO2 96%   BMI 26.09 kg/m²   Physical Exam   Constitutional:       Appearance: Normal appearance.   HENT:      Head: Normocephalic.   Eyes:      Conjunctiva/sclera: Conjunctivae normal.   Cardiovascular:      Rate and Rhythm: Normal rate and regular rhythm.      Heart sounds: Normal heart sounds. No murmur heard.  Pulmonary:      Effort: Pulmonary effort is normal.      Breath sounds: Normal breath sounds. No rhonchi or rales.   Abdominal:      General: Bowel sounds are normal.      Palpations: Abdomen  is soft.      Tenderness: There is no abdominal tenderness.   Musculoskeletal:      Cervical back: Neck supple.      Right lower leg: No edema.      Left lower leg: No edema.   Skin:     General: Skin is warm and dry.   Neurological:      General: No focal deficit present.      Mental Status: He is alert and oriented to person, place, and time. Mental status is at baseline.   Psychiatric:         Mood and Affect: Mood normal.         Behavior: Behavior normal.   No spinal tenderness or paraspinal tenderness.  Motor strength 5 out of 5 bilaterally.  Sensation intact bilaterally.    ASSESSMENT AND PLAN:   1. Adult general medical exam  Continue to eat healthy.  Fruits and vegetables.  Exercise regularly.  Reviewed vaccines.  He is due for Shingrix vaccine and PCV 20.  I have discussed with him regarding this.  Colonoscopy up-to-date.  PSA ordered.  Regular labs ordered.  - CBC, Platelet; No Differential; Future  - Comp Metabolic Panel (14); Future  - Hemoglobin A1C; Future  - Lipid Panel; Future  - TSH W Reflex To Free T4; Future  - PSA Total, Screen; Future  - Vitamin B12; Future    2. Vitamin D deficiency  Status post ergocalciferol 50,000 units for 3 months and then currently taking supplements regularly.    Vitamin B12 excess-I we will recheck vitamin B12.  If it is still high, instructed patient to take it every other day.    3. 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  Can take NSAIDs with meals.  Will do x-ray lumbosacral spine.  Referral given for physical therapy.  - XR LUMBAR SPINE (MIN 4 VIEWS) (CPT=72110); Future  - Physical Therapy Referral - [facility]    Plan: Labs ordered, x-ray ordered, referral given for physical therapy.  I will follow-up on his labs.  If everything is good, I will see him back in 1 year.  Meanwhile, if there is any concerns he can contact me.  Addendum-he wanted to get PCV 20 today.  Orders  placed    The patient indicates understanding of these issues and agrees to the plan.  No follow-ups on file.    This note was prepared using Dragon Medical voice recognition dictation software. As a result errors may occur. When identified these errors have been corrected. While every attempt is made to correct errors during dictation discrepancies may still exist.         [1]   Allergies  Allergen Reactions    Mushrooms SWELLING

## 2025-08-18 RX ORDER — ALLOPURINOL 100 MG/1
100 TABLET ORAL DAILY
Qty: 90 TABLET | Refills: 0 | Status: SHIPPED | OUTPATIENT
Start: 2025-08-18

## 2025-08-18 RX ORDER — INDOMETHACIN 50 MG/1
50 CAPSULE ORAL DAILY PRN
Qty: 30 CAPSULE | Refills: 0 | Status: SHIPPED | OUTPATIENT
Start: 2025-08-18

## (undated) DEVICE — KIT CLEAN ENDOKIT 1.1OZ GOWNX2

## (undated) DEVICE — KIT ENDO ORCAPOD 160/180/190

## (undated) DEVICE — Device: Brand: DUAL NARE NASAL CANNULAE FEMALE LUER CON 7FT O2 TUBE

## (undated) DEVICE — FORCEP RADIAL JAW 4

## (undated) DEVICE — 60 ML SYRINGE REGULAR TIP: Brand: MONOJECT

## (undated) DEVICE — MEDI-VAC NON-CONDUCTIVE SUCTION TUBING 6MM X 1.8M (6FT.) L: Brand: CARDINAL HEALTH

## (undated) DEVICE — SNARE ENDOSCOPIC 10MM ROUND

## (undated) NOTE — LETTER
[address] [facility] [address] [place], [place]  Authorization for Invasive Procedure                                                                                           I hereby authorize Fredrick Pulido MD, my physician and his/her assistants (if applicable), which may include medical students, residents, and/or fellows, to perform the following surgical operation/ procedure and administer such anesthesia as may be determined necessary by my physician: Operation/Procedure name (s) COLONOSCOPY on [address]   2. I recognize that during the surgical operation/procedure, unforeseen conditions may necessitate additional or different procedures than those listed above. I, therefore, further authorize and request that the above-named surgeon, assistants, or designees perform such procedures as are, in their judgment, necessary and desirable. 3.   My surgeon/physician has discussed prior to my surgery the potential benefits, risks and side effects of this procedure; the likelihood of achieving goals; and potential problems that might occur during recuperation. They also discussed reasonable alternatives to the procedure, including risks, benefits, and side effects related to the alternatives and risks related to not receiving this procedure. I have had all my questions answered and I acknowledge that no guarantee has been made as to the result that may be obtained. 4.   Should the need arise during my operation/procedure, which includes change of level of care prior to discharge, I also consent to the administration of blood and/or blood products. Further, I understand that despite careful testing and screening of blood or blood products by collecting agencies, I may still be subject to ill effects as a result of receiving a blood transfusion and/or blood products.   The following are some, but not all, of the potential risks that can occur: fever and allergic reactions, hemolytic reactions, transmission of diseases such as Hepatitis, AIDS and Cytomegalovirus (CMV) and fluid overload. In the event that I wish to have an autologous transfusion of my own blood, or a directed donor transfusion, I will discuss this with my physician. Check only if Refusing Blood or Blood Products  I understand refusal of blood or blood products as deemed necessary by my physician may have serious consequences to my condition to include possible death. I hereby assume responsibility for my refusal and release the hospital, its personnel, and my physicians from any responsibility for the consequences of my refusal.    o  Refuse   5. I authorize the use of any specimen, organs, tissues, body parts or foreign objects that may be removed from my body during the operation/procedure for diagnosis, research or teaching purposes and their subsequent disposal by hospital authorities. I also authorize the release of specimen test results and/or written reports to my treating physician on the hospital medical staff or other referring or consulting physicians involved in my care, at the discretion of the Pathologist or my treating physician. 6.   I consent to the photographing or videotaping of the operations or procedures to be performed, including appropriate portions of my body for medical, scientific, or educational purposes, provided my identity is not revealed by the pictures or by descriptive texts accompanying them. If the procedure has been photographed/videotaped, the surgeon will obtain the original picture, image, videotape or CD. The hospital will not be responsible for storage, release or maintenance of the picture, image, tape or CD.    7.   I consent to the presence of a  or observers in the operating room as deemed necessary by my physician or their designees.     8.   I recognize that in the event my procedure results in extended X-Ray/fluoroscopy time, I may develop a skin reaction. 9. If I have a Do Not Attempt Resuscitation (DNAR) order in place, that status will be suspended while in the operating room, procedural suite, and during the recovery period unless otherwise explicitly stated by me (or a person authorized to consent on my behalf). The surgeon or my attending physician will determine when the applicable recovery period ends for purposes of reinstating the DNAR order. 10. Patients having a sterilization procedure: I understand that if the procedure is successful the results will be permanent and it will therefore be impossible for me to inseminate, conceive, or bear children. I also understand that the procedure is intended to result in sterility, although the result has not been guaranteed. 11. I acknowledge that my physician has explained sedation/analgesia administration to me including the risk and benefits I consent to the administration of sedation/analgesia as may be necessary or desirable in the judgment of my physician. I CERTIFY THAT I HAVE READ AND FULLY UNDERSTAND THE ABOVE CONSENT TO OPERATION and/or OTHER PROCEDURE.     _________________________________________ _________________________________     ___________________________________  Signature of Patient     Signature of Responsible Person                   Printed Name of Responsible Person                              _________________________________________ ______________________________        ___________________________________  Signature of Witness         Date  Time         Relationship to Patient    STATEMENT OF PHYSICIAN My signature below affirms that prior to the time of the procedure; I have explained to the patient and/or his/her legal representative, the risks and benefits involved in the proposed treatment and any reasonable alternative to the proposed treatment.  I have also explained the risks and benefits involved in refusal of the proposed treatment and alternatives to the proposed treatment and have answered the patient's questions.  If I have a significant financial interest in a co-management agreement or a significant financial interest in any product or implant, or other significant relationship used in this procedure/surgery, I have disclosed this and had a discussion with my patient.     _______________________________________________________________ _____________________________  (Signature of Physician)                                                                                         (Date)                                   (Time)  Patient Name: Tristen Snyder    : 1960   Printed: 2023      Medical Record #: [id number]                                              Page 1 of 1